# Patient Record
Sex: FEMALE | Race: WHITE | NOT HISPANIC OR LATINO | Employment: FULL TIME | URBAN - METROPOLITAN AREA
[De-identification: names, ages, dates, MRNs, and addresses within clinical notes are randomized per-mention and may not be internally consistent; named-entity substitution may affect disease eponyms.]

---

## 2018-11-07 ENCOUNTER — OFFICE VISIT (OUTPATIENT)
Dept: MEDICAL GROUP | Facility: PHYSICIAN GROUP | Age: 58
End: 2018-11-07
Payer: COMMERCIAL

## 2018-11-07 VITALS
BODY MASS INDEX: 25.46 KG/M2 | SYSTOLIC BLOOD PRESSURE: 102 MMHG | HEIGHT: 68 IN | DIASTOLIC BLOOD PRESSURE: 72 MMHG | RESPIRATION RATE: 12 BRPM | WEIGHT: 168 LBS | HEART RATE: 72 BPM | TEMPERATURE: 97.5 F | OXYGEN SATURATION: 95 %

## 2018-11-07 DIAGNOSIS — E03.9 ACQUIRED HYPOTHYROIDISM: ICD-10-CM

## 2018-11-07 DIAGNOSIS — E89.0 H/O PARTIAL THYROIDECTOMY: ICD-10-CM

## 2018-11-07 DIAGNOSIS — L30.9 DERMATITIS: ICD-10-CM

## 2018-11-07 DIAGNOSIS — G89.29 CHRONIC PAIN OF LEFT KNEE: ICD-10-CM

## 2018-11-07 DIAGNOSIS — Z85.820 HISTORY OF MELANOMA: ICD-10-CM

## 2018-11-07 DIAGNOSIS — M25.562 CHRONIC PAIN OF LEFT KNEE: ICD-10-CM

## 2018-11-07 DIAGNOSIS — Z13.6 SCREENING FOR CARDIOVASCULAR CONDITION: ICD-10-CM

## 2018-11-07 DIAGNOSIS — N95.1 VAGINAL DRYNESS, MENOPAUSAL: ICD-10-CM

## 2018-11-07 DIAGNOSIS — N95.1 MENOPAUSAL SYNDROME: ICD-10-CM

## 2018-11-07 PROBLEM — L40.8 OTHER PSORIASIS: Status: RESOLVED | Noted: 2018-11-07 | Resolved: 2018-11-07

## 2018-11-07 PROBLEM — L40.8 OTHER PSORIASIS: Status: ACTIVE | Noted: 2018-11-07

## 2018-11-07 PROCEDURE — 99204 OFFICE O/P NEW MOD 45 MIN: CPT | Performed by: FAMILY MEDICINE

## 2018-11-07 ASSESSMENT — PATIENT HEALTH QUESTIONNAIRE - PHQ9: CLINICAL INTERPRETATION OF PHQ2 SCORE: 0

## 2018-11-07 NOTE — ASSESSMENT & PLAN NOTE
She describes left knee pain that occurs intermittently.  She describes mild stiffness of the knee and difficulty flexing it entirely.  She denies visible swelling.  Symptoms seem to be worse after increased activity but are sometimes present in the morning.

## 2018-11-07 NOTE — ASSESSMENT & PLAN NOTE
Left forearm itching for 6 months.  Previously diagnosed as psoriasis and using topical steroids.  Seen by dermatology

## 2018-11-07 NOTE — PROGRESS NOTES
CC:  Knee pain    HISTORY OF THE PRESENT ILLNESS: Patient is a 58 y.o. female. This pleasant patient is here today to establish care     Health Maintenance: Reviewed, needs pap      History of melanoma  She recently had a localized melanoma removed from her arm with sentinal node biopsy.  She has follow up scheduled with dermatology, she does not remember the providers name today.    Dermatitis  Left forearm itching for 6 months.  Previously diagnosed as psoriasis and using topical steroids.  Seen by dermatology    Menopausal syndrome  She has occasional hot flashes.  She believes they are improving in severity.  They occur almost daily but she does not sweat as often.  She has never been on HRT.    Chronic pain of left knee  She describes left knee pain that occurs intermittently.  She describes mild stiffness of the knee and difficulty flexing it entirely.  She denies visible swelling.  Symptoms seem to be worse after increased activity but are sometimes present in the morning.      Vaginal dryness, menopausal  She reports vaginal dryness that has been an issue for some time.  This makes intercourse difficult.  She denies urinary symptoms       Allergies: Patient has no known allergies.    Current Outpatient Prescriptions Ordered in [facility]   Medication Sig Dispense Refill   • conjugated estrogen (PREMARIN) 0.625 MG/GM Cream Insert 0.5 g in vagina every day. 1 Tube 1     No current Epic-ordered facility-administered medications on file.        Past Medical History:   Diagnosis Date   • Hypothyroidism    • Melanoma (HCC)        Past Surgical History:   Procedure Laterality Date   • THYROIDECTOMY Left 1985   • FOOT ORIF Left     lisfranc   • SHOULDER ARTHROSCOPY Left     repair of muscle injury       Social History   Substance Use Topics   • Smoking status: Never Smoker   • Smokeless tobacco: Never Used   • Alcohol use No       Social History     Social History Narrative   • No narrative on file       Family History  "  Problem Relation Age of Onset   • Hypertension Mother    • No Known Problems Father    • Breast Cancer Paternal Grandmother        ROS:     - Constitutional: Negative for fever, chills, unexpected weight change, and fatigue/generalized weakness.     - HEENT: Negative for headaches, vision changes, hearing changes, ear pain, ear discharge, rhinorrhea, sinus congestion, sore throat, and neck pain.      - Respiratory: Negative for cough, sputum production, chest congestion, dyspnea, wheezing, and crackles.      - Cardiovascular: Negative for chest pain, palpitations, orthopnea, and bilateral lower extremity edema.     - Gastrointestinal: Negative for heartburn, nausea, vomiting, abdominal pain, hematochezia, melena, diarrhea, constipation, and greasy/foul-smelling stools.     - Genitourinary: Negative for dysuria, polyuria, hematuria, pyuria, urinary urgency, and urinary incontinence.    - Musculoskeletal: Negative for myalgias, back pain.     - Skin: Negative for rash, itching, cyanotic skin color change.     - Neurological: Negative for dizziness, tingling, tremors, focal sensory deficit, focal weakness and headaches.     - Endo/Heme/Allergies: Does not bruise/bleed easily.     - Psychiatric/Behavioral: Negative for depression, suicidal/homicidal ideation and memory loss.       Exam: Blood pressure 102/72, pulse 72, temperature 36.4 °C (97.5 °F), resp. rate 12, height 1.727 m (5' 8\"), weight 76.2 kg (168 lb), SpO2 95 %. Body mass index is 25.54 kg/m².    General: Normal appearing. No distress.  HEENT: Normocephalic. Eyes conjunctiva clear lids without ptosis, pupils equal and reactive to light accommodation, ears normal shape and contour, canals are clear bilaterally, tympanic membranes are benign, nasal mucosa benign, oropharynx is without erythema, edema or exudates.   Neck: Supple without JVD or bruit. Thyroid is not enlarged.  Pulmonary: Clear to ausculation.  Normal effort. No rales, ronchi, or " wheezing.  Cardiovascular: Regular rate and rhythm without murmur. Carotid and radial pulses are intact and equal bilaterally.  Abdomen: Soft, nontender, nondistended. Normal bowel sounds. Liver and spleen are not palpable  Neurologic: Grossly nonfocal  Lymph: No cervical, supraclavicular or axillary lymph nodes are palpable  Skin: Warm and dry.  No obvious lesions.  Musculoskeletal: Normal gait. No extremity cyanosis, clubbing, or edema. Left knee with FROM, no swelling or joint line tenderness, stable to varus and valgus stress, negative Vijay   Psych: Normal mood and affect. Alert and oriented x3. Judgment and insight is normal.    Please note that this dictation was created using voice recognition software. I have made every reasonable attempt to correct obvious errors, but I expect that there are errors of grammar and possibly content that I did not discover before finalizing the note.      Assessment/Plan  1. History of melanoma  She will continue to follow with dermatology at least yearly, or as recommended    2. Dermatitis  No obvious rash today.  May continue to use topical steroids as needed    3. H/O partial thyroidectomy  Will continue to follow TSH, she has a history of requiring thyroid medication.    4. Acquired hypothyroidism  - TSH; Future    5. Menopausal syndrome  Discussed management of menopausal symptoms.  Symptoms are manageable and she does not wish to consider steroids. If worsening can consider clonidine in the future.    6. Vaginal dryness, menopausal  Discussed use of topical estrogen.  Low risk for hormone related complication.    - conjugated estrogen (PREMARIN) 0.625 MG/GM Cream; Insert 0.5 g in vagina every day.  Dispense: 1 Tube; Refill: 1    7. Screening for cardiovascular condition  Routine screening labs ordered.  - LIPID PROFILE; Future  - COMP METABOLIC PANEL; Future    8. Chronic pain of left knee  Discussed knee pain causes.  This may be mild patellofemoral vs mild  arthritis.  Would continue symptomatic therapy for now.  Discussed small amount of weight loss and healthy diet.

## 2018-11-07 NOTE — ASSESSMENT & PLAN NOTE
She recently had a localized melanoma removed from her arm with sentinal node biopsy.  She has follow up scheduled with dermatology, she does not remember the providers name today.

## 2018-11-07 NOTE — ASSESSMENT & PLAN NOTE
She has occasional hot flashes.  She believes they are improving in severity.  They occur almost daily but she does not sweat as often.  She has never been on HRT.

## 2018-11-08 DIAGNOSIS — Z13.6 SCREENING FOR CARDIOVASCULAR CONDITION: ICD-10-CM

## 2018-11-08 DIAGNOSIS — E03.9 ACQUIRED HYPOTHYROIDISM: ICD-10-CM

## 2018-11-08 NOTE — ASSESSMENT & PLAN NOTE
She reports vaginal dryness that has been an issue for some time.  This makes intercourse difficult.  She denies urinary symptoms

## 2018-11-12 PROBLEM — E78.2 MIXED HYPERLIPIDEMIA: Status: ACTIVE | Noted: 2018-11-12

## 2018-11-13 ENCOUNTER — TELEPHONE (OUTPATIENT)
Dept: MEDICAL GROUP | Facility: PHYSICIAN GROUP | Age: 58
End: 2018-11-13

## 2018-11-13 NOTE — TELEPHONE ENCOUNTER
----- Message from Cristina Escalera M.D. sent at 11/12/2018  6:25 PM PST -----  Please let her know the previously mentioned note.

## 2018-11-19 ENCOUNTER — HOSPITAL ENCOUNTER (OUTPATIENT)
Facility: MEDICAL CENTER | Age: 58
End: 2018-11-19
Attending: FAMILY MEDICINE
Payer: COMMERCIAL

## 2018-11-19 ENCOUNTER — OFFICE VISIT (OUTPATIENT)
Dept: MEDICAL GROUP | Facility: PHYSICIAN GROUP | Age: 58
End: 2018-11-19
Payer: COMMERCIAL

## 2018-11-19 VITALS
WEIGHT: 162 LBS | TEMPERATURE: 97.7 F | BODY MASS INDEX: 24.55 KG/M2 | SYSTOLIC BLOOD PRESSURE: 128 MMHG | RESPIRATION RATE: 14 BRPM | DIASTOLIC BLOOD PRESSURE: 86 MMHG | HEART RATE: 74 BPM | OXYGEN SATURATION: 98 % | HEIGHT: 68 IN

## 2018-11-19 DIAGNOSIS — E78.2 MIXED HYPERLIPIDEMIA: ICD-10-CM

## 2018-11-19 DIAGNOSIS — E89.0 H/O PARTIAL THYROIDECTOMY: ICD-10-CM

## 2018-11-19 DIAGNOSIS — Z12.4 CERVICAL CANCER SCREENING: ICD-10-CM

## 2018-11-19 DIAGNOSIS — Z23 NEED FOR VACCINATION: ICD-10-CM

## 2018-11-19 PROCEDURE — 90714 TD VACC NO PRESV 7 YRS+ IM: CPT | Performed by: FAMILY MEDICINE

## 2018-11-19 PROCEDURE — 90471 IMMUNIZATION ADMIN: CPT | Performed by: FAMILY MEDICINE

## 2018-11-19 PROCEDURE — 99396 PREV VISIT EST AGE 40-64: CPT | Mod: 25 | Performed by: FAMILY MEDICINE

## 2018-11-20 LAB
FORWARD REASON: SPWHY: NORMAL
FORWARDED TO LAB: SPWHR: NORMAL
SPECIMEN SENT: SPWT1: NORMAL

## 2020-05-26 ENCOUNTER — TELEPHONE (OUTPATIENT)
Dept: HEALTH INFORMATION MANAGEMENT | Facility: OTHER | Age: 60
End: 2020-05-26

## 2020-05-26 NOTE — TELEPHONE ENCOUNTER
1. Caller Name: Amber Hanson                 Call Back Number: [phone]  [facility] PCP or Specialty Provider: Yes         2.  Does patient have any new or worsening symptoms of respiratory illness? Yes, the patient reports the following respiratory symptoms: cough, shortness of breath or difficulty breathing and muscle pain.    3.  Does patient have any comoribidities? None     4.  Has the patient traveled in the last 14 days OR had any known contact with someone who is suspected or confirmed to have COVID-19?  No.    5. Disposition: Advised to perform self care, monitor for worsening symptoms and to call back in 3 days if no improvement    Note routed to [facility] Provider: MARIEL only.  She is being followed in [place] for her symptoms and she is pending a referral for her pulmonologist.

## 2020-06-03 ENCOUNTER — TELEPHONE (OUTPATIENT)
Dept: PULMONOLOGY | Facility: HOSPICE | Age: 60
End: 2020-06-03

## 2020-06-03 NOTE — TELEPHONE ENCOUNTER
The patient called and stated that she recently had imaging done at [facility].  She had a chest xray and a CT chest.  They are scanned into media.  She is asking if she needs any more imaging before her appt on 6/29.  Please advise, thank you.

## 2020-06-25 ENCOUNTER — HOSPITAL ENCOUNTER (OUTPATIENT)
Dept: RADIOLOGY | Facility: MEDICAL CENTER | Age: 60
End: 2020-06-25
Payer: COMMERCIAL

## 2020-06-29 ENCOUNTER — OFFICE VISIT (OUTPATIENT)
Dept: PULMONOLOGY | Facility: HOSPICE | Age: 60
End: 2020-06-29
Payer: COMMERCIAL

## 2020-06-29 VITALS
SYSTOLIC BLOOD PRESSURE: 130 MMHG | TEMPERATURE: 98.1 F | WEIGHT: 149 LBS | HEART RATE: 66 BPM | HEIGHT: 68 IN | RESPIRATION RATE: 16 BRPM | BODY MASS INDEX: 22.58 KG/M2 | OXYGEN SATURATION: 97 % | DIASTOLIC BLOOD PRESSURE: 72 MMHG

## 2020-06-29 DIAGNOSIS — R93.89 ABNORMAL CT OF THE CHEST: ICD-10-CM

## 2020-06-29 DIAGNOSIS — R05.9 COUGH: ICD-10-CM

## 2020-06-29 PROCEDURE — 99204 OFFICE O/P NEW MOD 45 MIN: CPT | Performed by: INTERNAL MEDICINE

## 2020-06-29 ASSESSMENT — ENCOUNTER SYMPTOMS
SPUTUM PRODUCTION: 0
SHORTNESS OF BREATH: 0
NAUSEA: 0
VOMITING: 0
WEIGHT LOSS: 0
EYE REDNESS: 0
FEVER: 0
COUGH: 1
CLAUDICATION: 0
DOUBLE VISION: 0
SINUS PAIN: 0
STRIDOR: 0
DIARRHEA: 0
HEADACHES: 0
MYALGIAS: 0
PHOTOPHOBIA: 0
DEPRESSION: 0
WHEEZING: 0
FOCAL WEAKNESS: 0
TREMORS: 0
PND: 0
EYE DISCHARGE: 0
WEAKNESS: 0
SPEECH CHANGE: 0
SORE THROAT: 0
CHILLS: 0
NECK PAIN: 0
EYE PAIN: 0
DIZZINESS: 0
DIAPHORESIS: 0
HEMOPTYSIS: 0
ABDOMINAL PAIN: 0
BACK PAIN: 0
PALPITATIONS: 0
FALLS: 0
HEARTBURN: 0
ORTHOPNEA: 0
BLURRED VISION: 0
CONSTIPATION: 0

## 2020-06-29 NOTE — PROGRESS NOTES
Chief Complaint   Patient presents with   • Establish Care     referral GATITO Gillis PA-C        HPI: This patient is a 60 y.o. female presenting for evaluation of cough and abnormal CT chest.  The patient's past medical history significant for hypothyroid and history of left arm melanoma status post resection.  She is a lifelong non-smoker.  She is retired from work in property management and lives in [place].  No family history of pulmonary, cardiac or autoimmune disease.  She has 1 cat at home, no birds no history of mold damage to the home.  She presents today for evaluation of abnormal CT chest that was obtained on May 19.  Patient had onset of symptoms including shortness of breath, cough productive of brown sputum, low-grade fever to 100 and generalized malaise in early April.  She was ruled out for coronavirus and treated on 2 occasions with antibiotics.  Symptoms persisted for roughly 1 month at which point a chest x-ray was read as mildly abnormal and a CT chest on May 19 showed tree-in-bud inflammation mainly in the right upper lobe and right middle lobe but to some extent in the lingula and left lower lobe as well.  There was airway thickening and some mild consolidation in the lingula and right middle lobe.  No concerning nodules or lymphadenopathy.  Over the past 4 weeks the patient symptoms have improved significantly.  Her shortness of breath has resolved.  She is no longer experiencing low-grade fevers.  She did have some weight loss at the onset of symptoms but this has stopped and actually she has gained the weight back.  She does have cough that is mainly dry and occasionally productive of clear sputum first thing in the morning but per patient markedly improved from April and even early May.  She is back to hiking and tolerating this well.  No chest pain.  No night sweats.    Past Medical History:   Diagnosis Date   • Hypothyroidism    • Melanoma (HCC)        Social History      Socioeconomic History   • Marital status:      Spouse name: Not on file   • Number of children: Not on file   • Years of education: Not on file   • Highest education level: Not on file   Occupational History   • Not on file   Social Needs   • Financial resource strain: Not on file   • Food insecurity     Worry: Not on file     Inability: Not on file   • Transportation needs     Medical: Not on file     Non-medical: Not on file   Tobacco Use   • Smoking status: Never Smoker   • Smokeless tobacco: Never Used   Substance and Sexual Activity   • Alcohol use: No   • Drug use: No   • Sexual activity: Never     Partners: Male   Lifestyle   • Physical activity     Days per week: Not on file     Minutes per session: Not on file   • Stress: Not on file   Relationships   • Social connections     Talks on phone: Not on file     Gets together: Not on file     Attends Hoahaoism service: Not on file     Active member of club or organization: Not on file     Attends meetings of clubs or organizations: Not on file     Relationship status: Not on file   • Intimate partner violence     Fear of current or ex partner: Not on file     Emotionally abused: Not on file     Physically abused: Not on file     Forced sexual activity: Not on file   Other Topics Concern   • Not on file   Social History Narrative   • Not on file       Family History   Problem Relation Age of Onset   • Hypertension Mother    • No Known Problems Father    • Breast Cancer Paternal Grandmother        Current Outpatient Medications on File Prior to Visit   Medication Sig Dispense Refill   • conjugated estrogen (PREMARIN) 0.625 MG/GM Cream Insert 0.5 g in vagina every day. (Patient not taking: Reported on 6/29/2020) 1 Tube 1     No current facility-administered medications on file prior to visit.        Allergies: Patient has no known allergies.    ROS:   Review of Systems   Constitutional: Negative for chills, diaphoresis, fever, malaise/fatigue and weight  "loss.   HENT: Negative for congestion, ear discharge, ear pain, hearing loss, nosebleeds, sinus pain, sore throat and tinnitus.    Eyes: Negative for blurred vision, double vision, photophobia, pain, discharge and redness.   Respiratory: Positive for cough. Negative for hemoptysis, sputum production, shortness of breath, wheezing and stridor.    Cardiovascular: Negative for chest pain, palpitations, orthopnea, claudication, leg swelling and PND.   Gastrointestinal: Negative for abdominal pain, constipation, diarrhea, heartburn, nausea and vomiting.   Genitourinary: Negative for dysuria and urgency.   Musculoskeletal: Negative for back pain, falls, joint pain, myalgias and neck pain.   Skin: Negative for itching and rash.   Neurological: Negative for dizziness, tremors, speech change, focal weakness, weakness and headaches.   Endo/Heme/Allergies: Negative for environmental allergies.   Psychiatric/Behavioral: Negative for depression.       /72 (BP Location: Left arm, Patient Position: Sitting, BP Cuff Size: Adult)   Pulse 66   Temp 36.7 °C (98.1 °F) (Temporal)   Resp 16   Ht 1.727 m (5' 8\")   Wt 67.6 kg (149 lb)   SpO2 97%     Physical Exam:  Physical Exam  Constitutional:       General: She is not in acute distress.     Appearance: Normal appearance. She is well-developed and normal weight.   HENT:      Head: Normocephalic and atraumatic.      Right Ear: External ear normal.      Left Ear: External ear normal.      Nose: Nose normal. No congestion.      Mouth/Throat:      Mouth: Mucous membranes are moist.      Pharynx: Oropharynx is clear. No oropharyngeal exudate.   Eyes:      General: No scleral icterus.     Extraocular Movements: Extraocular movements intact.      Conjunctiva/sclera: Conjunctivae normal.      Pupils: Pupils are equal, round, and reactive to light.   Neck:      Musculoskeletal: Neck supple.      Vascular: No JVD.      Trachea: No tracheal deviation.   Cardiovascular:      Rate and " Rhythm: Normal rate and regular rhythm.      Heart sounds: Normal heart sounds. No murmur. No friction rub. No gallop.    Pulmonary:      Effort: Pulmonary effort is normal. No accessory muscle usage or respiratory distress.      Breath sounds: Normal breath sounds. No wheezing or rales.   Abdominal:      General: There is no distension.      Palpations: Abdomen is soft.      Tenderness: There is no abdominal tenderness.   Musculoskeletal: Normal range of motion.         General: No tenderness or deformity.   Lymphadenopathy:      Cervical: No cervical adenopathy.   Skin:     General: Skin is warm and dry.      Findings: No rash.      Nails: There is no clubbing.     Neurological:      Mental Status: She is alert and oriented to person, place, and time.      Cranial Nerves: No cranial nerve deficit.      Gait: Gait normal.         PFTs as reviewed by me personally: None    Imaging as reviewed by me personally: As per HPI    Assessment:  1. Cough  DX-CHEST-2 VIEWS   2. Abnormal CT of the chest  DX-CHEST-2 VIEWS       Plan:  1.  I suspect this is in part related to post infectious reactive airways disease.  While her CT is concerning for NTM, overall symptomatic improvement is encouraging and we may just be seen residual inflammation following a viral infection.  She does have rescue bronchodilator which she does not feel helped significantly and overall her cough is continuing to improve.  Continue observational therapy and follow-up imaging and assessment of symptoms as per below.  2.  Demographically patient could meet the profile for nontuberculous mycobacteria however symptoms were fairly acute in onset and improved significantly.  Her CT was obtained during her when going which we would expect to continue to see abnormality on imaging she suffered from even an acute viral pneumonia.  We discussed follow-up imaging in 3 months patient wanted to avoid CT and given that we would not treat NTM if she was  asymptomatic, we agreed to follow-up in 3 months to reassess symptoms and chest x-ray.  If symptoms are worsening her chest x-ray is worsening then we will do a CT chest.  Return in about 3 months (around 9/29/2020) for cxr.

## 2021-03-15 DIAGNOSIS — Z23 NEED FOR VACCINATION: ICD-10-CM

## 2023-01-02 NOTE — PROGRESS NOTES
"S:  Amber Garcia is a 58 y.o.,femalewho presents today for her Pap and GYN exam.  .  She has been menopausal since age:48.  She has not utilized HRT.      Mixed hyperlipidemia  Tchol 221  HDL 85, Trig 78 in 11/2018  10 year risk today is 2%    H/O partial thyroidectomy  TSH was 4.29      She is G:3, P:1.    She has not had an Abnormal Pap previously.  Her last Mammogram was done:  10/2018, normal.  Her preventative health screens are up to date.    GYN ROS: no abnormal bleeding, pelvic pain or discharge, no breast pain or new or enlarging lumps on self exam    Patient Active Problem List    Diagnosis Date Noted   • Mixed hyperlipidemia 11/12/2018   • History of melanoma 11/07/2018   • Dermatitis 11/07/2018   • H/O partial thyroidectomy 11/07/2018   • Acquired hypothyroidism 11/07/2018   • Vaginal dryness, menopausal 11/07/2018   • Chronic pain of left knee 11/07/2018   • Menopausal syndrome 08/18/2012     Current Outpatient Prescriptions on File Prior to Visit   Medication Sig Dispense Refill   • conjugated estrogen (PREMARIN) 0.625 MG/GM Cream Insert 0.5 g in vagina every day. 1 Tube 1     No current facility-administered medications on file prior to visit.      Social History   Substance Use Topics   • Smoking status: Never Smoker   • Smokeless tobacco: Never Used   • Alcohol use No       O: /86 (BP Location: Left arm, Patient Position: Sitting)   Pulse 74   Temp 36.5 °C (97.7 °F)   Resp 14   Ht 1.727 m (5' 8\")   Wt 73.5 kg (162 lb)   SpO2 98%   BMI 24.63 kg/m²   Vitals Noted and Reviewed  Breasts: exam deferred  Vulva: grossly unremarkable, lesion noted at 7 o'clock, ~ 1 cm slightly erythematous scaly lesions  Vagina: no abnormal discharge, urethral caruncle, atrophy noted  Cervix: Parous, nonfriable, no surface lesions identified, Pap was performed  Uterus: Normal shape, position and consistency  Bimanual exam: No uteromegaly, negative chandelier sign, adnexa freely movable and without " Spontaneous, unlabored and symmetrical enlargements bilaterally  Rectal: not performed    A:  NormalGYN Exam      P:    pap smear  Mammogram done recently, records previously requested  return annually or prn  Td given  Reviewed cholesterol labs, risk low but goal to lower levels at next check  TSH borderline, repeat labs ordered    Pap was processed and sent to the lab    Recommend follow up as needed

## 2023-04-12 ENCOUNTER — APPOINTMENT (OUTPATIENT)
Dept: RADIOLOGY | Facility: MEDICAL CENTER | Age: 63
End: 2023-04-12
Attending: FAMILY MEDICINE
Payer: COMMERCIAL

## 2023-04-14 ENCOUNTER — HOSPITAL ENCOUNTER (OUTPATIENT)
Dept: RADIOLOGY | Facility: MEDICAL CENTER | Age: 63
End: 2023-04-14
Attending: INTERNAL MEDICINE
Payer: COMMERCIAL

## 2023-04-14 DIAGNOSIS — Z12.31 ENCOUNTER FOR MAMMOGRAM TO ESTABLISH BASELINE MAMMOGRAM: ICD-10-CM

## 2023-04-14 PROCEDURE — 77063 BREAST TOMOSYNTHESIS BI: CPT

## 2023-04-26 ENCOUNTER — HOSPITAL ENCOUNTER (OUTPATIENT)
Dept: RADIOLOGY | Facility: MEDICAL CENTER | Age: 63
End: 2023-04-26
Payer: COMMERCIAL